# Patient Record
Sex: MALE | Race: WHITE | NOT HISPANIC OR LATINO | Employment: FULL TIME | URBAN - NONMETROPOLITAN AREA
[De-identification: names, ages, dates, MRNs, and addresses within clinical notes are randomized per-mention and may not be internally consistent; named-entity substitution may affect disease eponyms.]

---

## 2021-05-04 ENCOUNTER — LAB (OUTPATIENT)
Dept: LAB | Facility: OTHER | Age: 46
End: 2021-05-04

## 2021-05-04 ENCOUNTER — OFFICE VISIT (OUTPATIENT)
Dept: FAMILY MEDICINE CLINIC | Facility: CLINIC | Age: 46
End: 2021-05-04

## 2021-05-04 VITALS
SYSTOLIC BLOOD PRESSURE: 122 MMHG | HEIGHT: 70 IN | OXYGEN SATURATION: 99 % | DIASTOLIC BLOOD PRESSURE: 70 MMHG | HEART RATE: 75 BPM | RESPIRATION RATE: 16 BRPM | WEIGHT: 191.5 LBS | BODY MASS INDEX: 27.41 KG/M2

## 2021-05-04 DIAGNOSIS — R06.02 SOB (SHORTNESS OF BREATH): ICD-10-CM

## 2021-05-04 DIAGNOSIS — N52.9 ERECTILE DYSFUNCTION, UNSPECIFIED ERECTILE DYSFUNCTION TYPE: Chronic | ICD-10-CM

## 2021-05-04 DIAGNOSIS — Z13.1 SCREENING FOR DIABETES MELLITUS: ICD-10-CM

## 2021-05-04 DIAGNOSIS — Z13.89 SCREENING FOR BLOOD OR PROTEIN IN URINE: ICD-10-CM

## 2021-05-04 DIAGNOSIS — R68.89 EXERCISE INTOLERANCE: ICD-10-CM

## 2021-05-04 DIAGNOSIS — K52.9 CHRONIC DIARRHEA OF UNKNOWN ORIGIN: Primary | Chronic | ICD-10-CM

## 2021-05-04 DIAGNOSIS — Z12.5 SCREENING FOR MALIGNANT NEOPLASM OF PROSTATE: ICD-10-CM

## 2021-05-04 DIAGNOSIS — Z13.220 SCREENING, LIPID: ICD-10-CM

## 2021-05-04 DIAGNOSIS — R06.09 DOE (DYSPNEA ON EXERTION): ICD-10-CM

## 2021-05-04 DIAGNOSIS — Z00.00 ENCOUNTER FOR MEDICAL EXAMINATION TO ESTABLISH CARE: ICD-10-CM

## 2021-05-04 DIAGNOSIS — Z13.29 SCREENING FOR THYROID DISORDER: ICD-10-CM

## 2021-05-04 DIAGNOSIS — Z13.21 ENCOUNTER FOR VITAMIN DEFICIENCY SCREENING: ICD-10-CM

## 2021-05-04 LAB
BACTERIA UR QL AUTO: ABNORMAL /HPF
BILIRUB UR QL STRIP: NEGATIVE
CLARITY UR: CLEAR
COLOR UR: ABNORMAL
GLUCOSE UR STRIP-MCNC: NEGATIVE MG/DL
HGB UR QL STRIP.AUTO: ABNORMAL
HYALINE CASTS UR QL AUTO: ABNORMAL /LPF
KETONES UR QL STRIP: NEGATIVE
LEUKOCYTE ESTERASE UR QL STRIP.AUTO: NEGATIVE
NITRITE UR QL STRIP: NEGATIVE
PH UR STRIP.AUTO: <=5 [PH] (ref 5.5–8)
PROT UR QL STRIP: NEGATIVE
RBC # UR: ABNORMAL /HPF
REF LAB TEST METHOD: ABNORMAL
SP GR UR STRIP: 1.02 (ref 1–1.03)
SQUAMOUS #/AREA URNS HPF: ABNORMAL /HPF
UROBILINOGEN UR QL STRIP: ABNORMAL
WBC UR QL AUTO: ABNORMAL /HPF

## 2021-05-04 PROCEDURE — 83036 HEMOGLOBIN GLYCOSYLATED A1C: CPT | Performed by: NURSE PRACTITIONER

## 2021-05-04 PROCEDURE — 93000 ELECTROCARDIOGRAM COMPLETE: CPT | Performed by: NURSE PRACTITIONER

## 2021-05-04 PROCEDURE — 36415 COLL VENOUS BLD VENIPUNCTURE: CPT | Performed by: NURSE PRACTITIONER

## 2021-05-04 PROCEDURE — 81001 URINALYSIS AUTO W/SCOPE: CPT | Performed by: NURSE PRACTITIONER

## 2021-05-04 PROCEDURE — 84439 ASSAY OF FREE THYROXINE: CPT | Performed by: NURSE PRACTITIONER

## 2021-05-04 PROCEDURE — 84443 ASSAY THYROID STIM HORMONE: CPT | Performed by: NURSE PRACTITIONER

## 2021-05-04 PROCEDURE — 82306 VITAMIN D 25 HYDROXY: CPT | Performed by: NURSE PRACTITIONER

## 2021-05-04 PROCEDURE — 84480 ASSAY TRIIODOTHYRONINE (T3): CPT | Performed by: NURSE PRACTITIONER

## 2021-05-04 PROCEDURE — 99204 OFFICE O/P NEW MOD 45 MIN: CPT | Performed by: NURSE PRACTITIONER

## 2021-05-04 RX ORDER — SILDENAFIL 100 MG/1
100 TABLET, FILM COATED ORAL DAILY PRN
Qty: 30 TABLET | Refills: 5 | Status: SHIPPED | OUTPATIENT
Start: 2021-05-04

## 2021-05-04 RX ORDER — OMEPRAZOLE 20 MG/1
20 CAPSULE, DELAYED RELEASE ORAL DAILY
COMMUNITY

## 2021-05-04 NOTE — PROGRESS NOTES
Subjective   Lázaro Dillard is a 45 y.o. male.       Chief Complaint   Patient presents with   • Establish Care         History of Present Illness   Multiple complaints: no insurance until October so a prioritization must be made as cannot afford all he needs right now.     CC#1: Diarrhea, watery stools with every meal: does not seem to bother him with small snacks. Has tried immodium OTC. When takes 2 immodium will not have diarrhea for up to 2-3 days but then it resumes as before.     CC#2:Erectile dysfunction noted first about 2 months ago, cannot achieve a full erection and it doesn't last long enough to complete intercourse. Never happened before and is constant, not intermittent in nature.   He wonders if his testosterone level is low due to ED. Explained to him most likely and common cause is CAD, needs evaluation, ruled out or treated before hormones would be replaced regardless of levels.     Risk of CAD:  Factors increasing his risk are male gender, smoking, history of Hypertension (questionable) though today his BP is in normal range. Needs screening for lipids. Due to recent onset of increased exercise intolerance will obtain EKG today in office.    Short of breath worsening exercise intolerance. Onset about 6 months ago. This is not affecting his life more than mild to moderately. He attributes it to his smoking. Smokes about 1ppd since age 17 or so. No current cough or wheezing.no exposure to Covid 19 infected or suspected to be infected persons. No fevers or chills. Does not feel sick.    Smoker. Lázaro Dillard  reports that he has been smoking cigarettes. He has a 15.00 pack-year smoking history. He does not have any smokeless tobacco history on file.. I have educated him on the risk of diseases from using tobacco products such as cancer, COPD, heart disease and cataracts. I advised him to quit and he is not willing to quit.I spent 3  minutes counseling the patient. When insurance coverage in full force,  "needs pulmonary function testing (baseline) and referral to pulmonology due to SOB/JIMENEZ smoking long term hx with 28 actual pack year history, starting at age 18 smoking 1PPD.      Exercise intolerance, no known CAD but has had a stress test with self report of normal results and prescription of a med beginning with an \"M\" 8 years ago in [place]. Cannot remember doctor's name, if a cardiologist or other, and no longer takes any med for heart or hypertension. Never been told of hyperlipid condition, CAD, AMI but thinks had HTN in the past. Needs full cardiology workup as he is a poor historian and records are not seen in [facility] or [facility] regarding previous cardiac testing.     Urinary frequency for years previously worked up by a urologist in [place] or somewhere close to [place], no records seen regarding this. Told had a bladder that \"triggers a bladder full switch in his brain at 1/2 capacity\". No treatment and no recall of diagnosis or doctors name nor specific tests performed. Reports always voids more often than the average person, doesn't take anything for this.    Family history of DM in mother and a sister. Two maternal aunts  of cancer but he doesn't know what type of cancer or its location. Did not meet father until he was 27 and has no medical knowledge of fathers health.     Mole on his left temple is irritated by glasses and often nicked by shaving. Present since a child but slow growing and wants removed. Gets scratched easily and often.     Mole on his left inner forearm about 4 inches from antecubital space is irritated and red, itchy now and quite often has these symptoms. Needs removed for biopsy.        GERD controlled well with prn use of OTC omeprazole.     No other complaints today.     Past Surgical History:   Procedure Laterality Date   • APPENDECTOMY     • ARM LACERATION REPAIR     • FOOT SURGERY     • HERNIA REPAIR        Social History     Socioeconomic History   • Marital " status: Single     Spouse name: Not on file   • Number of children: Not on file   • Years of education: Not on file   • Highest education level: Not on file   Tobacco Use   • Smoking status: Current Some Day Smoker     Packs/day: 1.00     Years: 15.00     Pack years: 15.00     Types: Cigarettes   Substance and Sexual Activity   • Alcohol use: Never      The following portions of the patient's history were reviewed and updated as appropriate: He  has a past medical history of Allergic, Arthritis, Erectile dysfunction, and GERD (gastroesophageal reflux disease)..    Review of Systems   Constitutional: Negative.  Negative for activity change, appetite change, chills, fever and unexpected weight loss.   HENT: Negative.  Negative for ear pain, postnasal drip, rhinorrhea, sinus pressure, sneezing, sore throat, swollen glands, trouble swallowing and voice change.    Eyes: Negative.  Negative for discharge, redness, itching and visual disturbance.   Respiratory: Negative for cough, shortness of breath and wheezing.    Cardiovascular: Negative.  Negative for chest pain and leg swelling.   Gastrointestinal: Negative.    Endocrine: Negative.  Negative for polydipsia, polyphagia and polyuria.   Genitourinary: Positive for erectile dysfunction. Negative for dysuria.   Musculoskeletal: Positive for arthralgias.   Skin: Negative.    Allergic/Immunologic: Negative.    Neurological: Negative.    Hematological: Negative.    Psychiatric/Behavioral: Negative.  Negative for behavioral problems, dysphoric mood, sleep disturbance, suicidal ideas and depressed mood.   All other systems reviewed and are negative.    PHQ-9 Depression Screening  Little interest or pleasure in doing things? 0   Feeling down, depressed, or hopeless? 0   Trouble falling or staying asleep, or sleeping too much?     Feeling tired or having little energy?     Poor appetite or overeating?     Feeling bad about yourself - or that you are a failure or have let  "yourself or your family down?     Trouble concentrating on things, such as reading the newspaper or watching television?     Moving or speaking so slowly that other people could have noticed? Or the opposite - being so fidgety or restless that you have been moving around a lot more than usual?     Thoughts that you would be better off dead, or of hurting yourself in some way?     PHQ-9 Total Score 0   If you checked off any problems, how difficult have these problems made it for you to do your work, take care of things at home, or get along with other people?        Objective    Vitals:    05/04/21 0826   BP: 122/70   BP Location: Left arm   Patient Position: Sitting   Cuff Size: Adult   Pulse: 75   Resp: 16   SpO2: 99%   Weight: 86.9 kg (191 lb 8 oz)   Height: 177.8 cm (70\")   PainSc: 0-No pain         ECG 12 Lead    Date/Time: 5/4/2021 7:14 PM  Performed by: Alejandra Serna APRN  Authorized by: Alejandra Serna APRN   Comparison: not compared with previous ECG   Previous ECG: no previous ECG available  Rhythm: sinus rhythm  Rate: normal  Conduction: conduction normal  ST Segments: ST segments normal  T Waves: T waves normal  QRS axis: normal  Other: no other findings    Clinical impression: normal ECG  Comments: EKG:   Indication: SOB/ JIMENEZ, unknown cardiac history   Vent Rate: 67  bpm   MELLISA:  146  ms   QRS: 100 ms   QT/QTc:384 / 405  ms   Interpretation: NSR.  Previous EKG: none for comparison available.               In future, when insurance is in force, will need referral to cardiology, pulmonology and gastroenterology if not already done. Also if still having irritation with moles on left temple and left upper forearm, look to excision/ biopsy.     Assessment/Plan   Diagnoses and all orders for this visit:    1. Chronic diarrhea of unknown origin (Primary)  -     Occult Blood X 3, Stool - Stool, Per Rectum; Future    2. Erectile dysfunction, unspecified erectile dysfunction type  -     sildenafil " (Viagra) 100 MG tablet; Take 1 tablet by mouth Daily As Needed for Erectile Dysfunction. Please run on Good Rx coupon.  Dispense: 30 tablet; Refill: 5    3. Encounter for vitamin deficiency screening  -     Vitamin D 25 Hydroxy  -     Vitamin B12; Future  -     Folate; Future    4. Screening, lipid  -     Lipid Panel    5. Screening for thyroid disorder  -     T4, Free  -     TSH  -     T3    6. Screening for diabetes mellitus  -     Hemoglobin A1c    7. Encounter for medical examination to establish care    8. Screening for malignant neoplasm of prostate  -     PSA Screen; Future  -     Cancel: Microalbumin / Creatinine Urine Ratio - Urine, Clean Catch    9. Screening for blood or protein in urine  -     Urinalysis With Microscopic - Urine, Clean Catch; Future    10. SOB (shortness of breath)  -     ECG 12 Lead    11. JIMENEZ (dyspnea on exertion)  -     ECG 12 Lead    12. Exercise intolerance  -     ECG 12 Lead    Other orders  -     Cancel: Vitamin B12 & Folate        Return in about 5 months (around 10/11/2021).           This document has been electronically signed by ASIM Carey on May 4, 2021 19:21 CDT

## 2021-05-05 LAB
25(OH)D3 SERPL-MCNC: 22.2 NG/ML (ref 30–100)
HBA1C MFR BLD: 5.17 % (ref 4.8–5.6)
T3 SERPL-MCNC: 140 NG/DL (ref 80–200)
T4 FREE SERPL-MCNC: 1.17 NG/DL (ref 0.93–1.7)
TSH SERPL DL<=0.05 MIU/L-ACNC: 0.93 UIU/ML (ref 0.27–4.2)

## 2021-05-11 DIAGNOSIS — E55.9 VITAMIN D DEFICIENCY: Primary | ICD-10-CM

## 2021-05-11 RX ORDER — ERGOCALCIFEROL 1.25 MG/1
50000 CAPSULE ORAL WEEKLY
Qty: 12 CAPSULE | Refills: 3 | Status: SHIPPED | OUTPATIENT
Start: 2021-05-11 | End: 2022-05-11